# Patient Record
Sex: MALE | Race: WHITE | NOT HISPANIC OR LATINO | Employment: OTHER | URBAN - METROPOLITAN AREA
[De-identification: names, ages, dates, MRNs, and addresses within clinical notes are randomized per-mention and may not be internally consistent; named-entity substitution may affect disease eponyms.]

---

## 2022-07-18 ENCOUNTER — HOSPITAL ENCOUNTER (EMERGENCY)
Facility: MEDICAL CENTER | Age: 72
End: 2022-07-20
Attending: EMERGENCY MEDICINE
Payer: MEDICARE

## 2022-07-18 DIAGNOSIS — T14.91XA SUICIDE ATTEMPT (HCC): ICD-10-CM

## 2022-07-18 DIAGNOSIS — S61.511A LACERATION OF RIGHT WRIST, INITIAL ENCOUNTER: ICD-10-CM

## 2022-07-18 LAB
ABO + RH BLD: NORMAL
ABO GROUP BLD: NORMAL
ALBUMIN SERPL BCP-MCNC: 3.7 G/DL (ref 3.2–4.9)
ALBUMIN/GLOB SERPL: 2.1 G/DL
ALP SERPL-CCNC: 72 U/L (ref 30–99)
ALT SERPL-CCNC: 13 U/L (ref 2–50)
ANION GAP SERPL CALC-SCNC: 10 MMOL/L (ref 7–16)
APTT PPP: 31.1 SEC (ref 24.7–36)
AST SERPL-CCNC: 13 U/L (ref 12–45)
BASOPHILS # BLD AUTO: 0.2 % (ref 0–1.8)
BASOPHILS # BLD: 0.03 K/UL (ref 0–0.12)
BILIRUB SERPL-MCNC: 0.9 MG/DL (ref 0.1–1.5)
BLD GP AB SCN SERPL QL: NORMAL
BUN SERPL-MCNC: 12 MG/DL (ref 8–22)
CALCIUM SERPL-MCNC: 8.3 MG/DL (ref 8.5–10.5)
CHLORIDE SERPL-SCNC: 108 MMOL/L (ref 96–112)
CO2 SERPL-SCNC: 20 MMOL/L (ref 20–33)
CREAT SERPL-MCNC: 1.03 MG/DL (ref 0.5–1.4)
EOSINOPHIL # BLD AUTO: 0 K/UL (ref 0–0.51)
EOSINOPHIL NFR BLD: 0 % (ref 0–6.9)
ERYTHROCYTE [DISTWIDTH] IN BLOOD BY AUTOMATED COUNT: 46.9 FL (ref 35.9–50)
ETHANOL BLD-MCNC: <10.1 MG/DL
GFR SERPLBLD CREATININE-BSD FMLA CKD-EPI: 78 ML/MIN/1.73 M 2
GLOBULIN SER CALC-MCNC: 1.8 G/DL (ref 1.9–3.5)
GLUCOSE SERPL-MCNC: 119 MG/DL (ref 65–99)
HCT VFR BLD AUTO: 38.4 % (ref 42–52)
HGB BLD-MCNC: 13.3 G/DL (ref 14–18)
IMM GRANULOCYTES # BLD AUTO: 0.12 K/UL (ref 0–0.11)
IMM GRANULOCYTES NFR BLD AUTO: 0.7 % (ref 0–0.9)
INR PPP: 1.17 (ref 0.87–1.13)
LYMPHOCYTES # BLD AUTO: 0.97 K/UL (ref 1–4.8)
LYMPHOCYTES NFR BLD: 5.5 % (ref 22–41)
MCH RBC QN AUTO: 33.9 PG (ref 27–33)
MCHC RBC AUTO-ENTMCNC: 34.6 G/DL (ref 33.7–35.3)
MCV RBC AUTO: 98 FL (ref 81.4–97.8)
MONOCYTES # BLD AUTO: 0.56 K/UL (ref 0–0.85)
MONOCYTES NFR BLD AUTO: 3.2 % (ref 0–13.4)
NEUTROPHILS # BLD AUTO: 15.84 K/UL (ref 1.82–7.42)
NEUTROPHILS NFR BLD: 90.4 % (ref 44–72)
NRBC # BLD AUTO: 0 K/UL
NRBC BLD-RTO: 0 /100 WBC
PLATELET # BLD AUTO: 171 K/UL (ref 164–446)
PMV BLD AUTO: 9.8 FL (ref 9–12.9)
POC BREATHALIZER: 0 PERCENT (ref 0–0.01)
POTASSIUM SERPL-SCNC: 4.3 MMOL/L (ref 3.6–5.5)
PROT SERPL-MCNC: 5.5 G/DL (ref 6–8.2)
PROTHROMBIN TIME: 14.8 SEC (ref 12–14.6)
RBC # BLD AUTO: 3.92 M/UL (ref 4.7–6.1)
RH BLD: NORMAL
SODIUM SERPL-SCNC: 138 MMOL/L (ref 135–145)
WBC # BLD AUTO: 17.5 K/UL (ref 4.8–10.8)

## 2022-07-18 PROCEDURE — 85025 COMPLETE CBC W/AUTO DIFF WBC: CPT

## 2022-07-18 PROCEDURE — 304999 HCHG REPAIR-SIMPLE/INTERMED LEVEL 1

## 2022-07-18 PROCEDURE — 86850 RBC ANTIBODY SCREEN: CPT

## 2022-07-18 PROCEDURE — 90471 IMMUNIZATION ADMIN: CPT

## 2022-07-18 PROCEDURE — 305308 HCHG STAPLER,SKIN,DISP.

## 2022-07-18 PROCEDURE — 304217 HCHG IRRIGATION SYSTEM

## 2022-07-18 PROCEDURE — 700101 HCHG RX REV CODE 250: Performed by: EMERGENCY MEDICINE

## 2022-07-18 PROCEDURE — A9270 NON-COVERED ITEM OR SERVICE: HCPCS | Performed by: EMERGENCY MEDICINE

## 2022-07-18 PROCEDURE — 36415 COLL VENOUS BLD VENIPUNCTURE: CPT

## 2022-07-18 PROCEDURE — 700102 HCHG RX REV CODE 250 W/ 637 OVERRIDE(OP): Performed by: EMERGENCY MEDICINE

## 2022-07-18 PROCEDURE — 86900 BLOOD TYPING SEROLOGIC ABO: CPT

## 2022-07-18 PROCEDURE — 96365 THER/PROPH/DIAG IV INF INIT: CPT

## 2022-07-18 PROCEDURE — 86901 BLOOD TYPING SEROLOGIC RH(D): CPT

## 2022-07-18 PROCEDURE — 303747 HCHG EXTRA SUTURE

## 2022-07-18 PROCEDURE — 90791 PSYCH DIAGNOSTIC EVALUATION: CPT

## 2022-07-18 PROCEDURE — 302970 POC BREATHALIZER: Performed by: EMERGENCY MEDICINE

## 2022-07-18 PROCEDURE — 29125 APPL SHORT ARM SPLINT STATIC: CPT

## 2022-07-18 PROCEDURE — 302874 HCHG BANDAGE ACE 2 OR 3""

## 2022-07-18 PROCEDURE — 85730 THROMBOPLASTIN TIME PARTIAL: CPT

## 2022-07-18 PROCEDURE — 90715 TDAP VACCINE 7 YRS/> IM: CPT | Performed by: EMERGENCY MEDICINE

## 2022-07-18 PROCEDURE — 700111 HCHG RX REV CODE 636 W/ 250 OVERRIDE (IP): Performed by: EMERGENCY MEDICINE

## 2022-07-18 PROCEDURE — 85610 PROTHROMBIN TIME: CPT

## 2022-07-18 PROCEDURE — 82077 ASSAY SPEC XCP UR&BREATH IA: CPT

## 2022-07-18 PROCEDURE — 700105 HCHG RX REV CODE 258: Performed by: EMERGENCY MEDICINE

## 2022-07-18 PROCEDURE — 80053 COMPREHEN METABOLIC PANEL: CPT

## 2022-07-18 PROCEDURE — 99285 EMERGENCY DEPT VISIT HI MDM: CPT

## 2022-07-18 RX ORDER — LIDOCAINE HYDROCHLORIDE AND EPINEPHRINE 10; 10 MG/ML; UG/ML
20 INJECTION, SOLUTION INFILTRATION; PERINEURAL ONCE
Status: COMPLETED | OUTPATIENT
Start: 2022-07-18 | End: 2022-07-18

## 2022-07-18 RX ORDER — CEFAZOLIN SODIUM 2 G/100ML
2 INJECTION, SOLUTION INTRAVENOUS ONCE
Status: COMPLETED | OUTPATIENT
Start: 2022-07-18 | End: 2022-07-18

## 2022-07-18 RX ORDER — CEPHALEXIN 500 MG/1
500 CAPSULE ORAL 4 TIMES DAILY
Status: DISCONTINUED | OUTPATIENT
Start: 2022-07-18 | End: 2022-07-20

## 2022-07-18 RX ADMIN — LIDOCAINE HYDROCHLORIDE,EPINEPHRINE BITARTRATE 20 ML: 10; .01 INJECTION, SOLUTION INFILTRATION; PERINEURAL at 17:00

## 2022-07-18 RX ADMIN — WATER 2 G: 100 INJECTION, SOLUTION INTRAVENOUS at 17:40

## 2022-07-18 RX ADMIN — CLOSTRIDIUM TETANI TOXOID ANTIGEN (FORMALDEHYDE INACTIVATED), CORYNEBACTERIUM DIPHTHERIAE TOXOID ANTIGEN (FORMALDEHYDE INACTIVATED), BORDETELLA PERTUSSIS TOXOID ANTIGEN (GLUTARALDEHYDE INACTIVATED), BORDETELLA PERTUSSIS FILAMENTOUS HEMAGGLUTININ ANTIGEN (FORMALDEHYDE INACTIVATED), BORDETELLA PERTUSSIS PERTACTIN ANTIGEN, AND BORDETELLA PERTUSSIS FIMBRIAE 2/3 ANTIGEN 0.5 ML: 5; 2; 2.5; 5; 3; 5 INJECTION, SUSPENSION INTRAMUSCULAR at 22:08

## 2022-07-18 RX ADMIN — CEPHALEXIN 500 MG: 500 CAPSULE ORAL at 22:08

## 2022-07-18 NOTE — ED PROVIDER NOTES
"ED Provider  Scribed for Ifeanyi Harris D.O. by Yakov Carter. 2022  4:24 PM    Means of arrival: EMS  History obtained from: Patient   History limited by: None     CHIEF COMPLAINT  Chief Complaint   Patient presents with   • Suicide Attempt     BIB EMS for suicide attempt. Pt found by neighbors in Pt's house with laceration across Rt wrist. Pt stated to RN \"It's the end for me, or at least I tried to make it that way\". Pt expresses concerns with his and his partner's financials, housing situations, and lack of employment. Pt stated he and his partner decided upon committing suicide this morning. EMS unsure on total Pt blood loss due to blood mixing from Pt and his partner. Per EMS, partner is .     HPI  Kaiden Gonzalez is a 72 y.o. male who presents to the Emergency Department for an attempted suicide attempt. Patient reports he cut his wrists and states his partner did the same. He reports doing this with a . Patient states he does not know the whereabouts of his partner. He reports associated depression. There are no alleviating or exacerbating factors. He denies associated fever, chills, nausea, vomiting, or diarrhea.    REVIEW OF SYSTEMS  See HPI for further details. All other systems are negative.     PAST MEDICAL HISTORY       SOCIAL HISTORY  Social History     Tobacco Use   • Smoking status: None pertinent   • Smokeless tobacco: None pertinent   Substance and Sexual Activity   • Alcohol use: None pertinent   • Drug use: None pertinent   • Sexual activity: None pertinent       SURGICAL HISTORY  patient denies any surgical history    FAMILY HISTORY  No family history of suicide attempts.     CURRENT MEDICATIONS  No current outpatient medications     ALLERGIES  No Known Allergies    PHYSICAL EXAM  VITAL SIGNS: There were no vitals taken for this visit.  Constitutional: Alert in mild  distress.  HENT: No signs of trauma, mucous membranes are moist  Eyes: Conjunctiva normal, Non-icteric. "   Neck: Normal range of motion, No tenderness, Supple.  Lymphatic: No lymphadenopathy noted.   Cardiovascular: Regular rate and rhythm, no murmurs.   Thorax & Lungs: Normal breath sounds, No respiratory distress, No wheezing, No chest tenderness.   Abdomen: Bowel sounds normal, Soft, No tenderness, No masses, No pulsatile masses. No peritoneal signs.  Skin: Warm, Dry, normal color.   Back: No bony tenderness, No CVA tenderness.   Extremities: No edema, No tenderness, No cyanosis. Right forearm anteriorly has a deep laceration through the tendons and muscles.  Musculoskeletal: Good range of motion in all major joints. No tenderness to palpation or major deformities noted.   Neurologic: Alert and oriented x4, Normal motor function, Normal sensory function, No focal deficits noted.   Psychiatric: Despondent, suicidal.       DIAGNOSTIC STUDIES / PROCEDURES    LABS  Results for orders placed or performed during the hospital encounter of 07/18/22   CBC WITH DIFFERENTIAL   Result Value Ref Range    WBC 17.5 (H) 4.8 - 10.8 K/uL    RBC 3.92 (L) 4.70 - 6.10 M/uL    Hemoglobin 13.3 (L) 14.0 - 18.0 g/dL    Hematocrit 38.4 (L) 42.0 - 52.0 %    MCV 98.0 (H) 81.4 - 97.8 fL    MCH 33.9 (H) 27.0 - 33.0 pg    MCHC 34.6 33.7 - 35.3 g/dL    RDW 46.9 35.9 - 50.0 fL    Platelet Count 171 164 - 446 K/uL    MPV 9.8 9.0 - 12.9 fL    Neutrophils-Polys 90.40 (H) 44.00 - 72.00 %    Lymphocytes 5.50 (L) 22.00 - 41.00 %    Monocytes 3.20 0.00 - 13.40 %    Eosinophils 0.00 0.00 - 6.90 %    Basophils 0.20 0.00 - 1.80 %    Immature Granulocytes 0.70 0.00 - 0.90 %    Nucleated RBC 0.00 /100 WBC    Neutrophils (Absolute) 15.84 (H) 1.82 - 7.42 K/uL    Lymphs (Absolute) 0.97 (L) 1.00 - 4.80 K/uL    Monos (Absolute) 0.56 0.00 - 0.85 K/uL    Eos (Absolute) 0.00 0.00 - 0.51 K/uL    Baso (Absolute) 0.03 0.00 - 0.12 K/uL    Immature Granulocytes (abs) 0.12 (H) 0.00 - 0.11 K/uL    NRBC (Absolute) 0.00 K/uL   COMP METABOLIC PANEL   Result Value Ref Range     Sodium 138 135 - 145 mmol/L    Potassium 4.3 3.6 - 5.5 mmol/L    Chloride 108 96 - 112 mmol/L    Co2 20 20 - 33 mmol/L    Anion Gap 10.0 7.0 - 16.0    Glucose 119 (H) 65 - 99 mg/dL    Bun 12 8 - 22 mg/dL    Creatinine 1.03 0.50 - 1.40 mg/dL    Calcium 8.3 (L) 8.5 - 10.5 mg/dL    AST(SGOT) 13 12 - 45 U/L    ALT(SGPT) 13 2 - 50 U/L    Alkaline Phosphatase 72 30 - 99 U/L    Total Bilirubin 0.9 0.1 - 1.5 mg/dL    Albumin 3.7 3.2 - 4.9 g/dL    Total Protein 5.5 (L) 6.0 - 8.2 g/dL    Globulin 1.8 (L) 1.9 - 3.5 g/dL    A-G Ratio 2.1 g/dL   COD (ADULT)   Result Value Ref Range    ABO Grouping Only A     Rh Grouping Only POS     Antibody Screen-Cod NEG    PROTHROMBIN TIME (INR)   Result Value Ref Range    PT 14.8 (H) 12.0 - 14.6 sec    INR 1.17 (H) 0.87 - 1.13   APTT   Result Value Ref Range    APTT 31.1 24.7 - 36.0 sec   DIAGNOSTIC ALCOHOL   Result Value Ref Range    Diagnostic Alcohol <10.1 <10.1 mg/dL   ESTIMATED GFR   Result Value Ref Range    GFR (CKD-EPI) 78 >60 mL/min/1.73 m 2   POC BREATHALIZER   Result Value Ref Range    POC Breathalizer 0.00 0.00 - 0.01 Percent      All labs reviewed by me.    PROCEDURES:    Laceration Repair Procedure Note    Indication: Laceration    Procedure: The patient was placed in the appropriate position and anesthesia around the laceration was obtained by infiltration using 10 cc's of 1% Lidocaine with epinephrine. The area was then irrigated with normal saline. The laceration was closed in two layers. The subcutaneous layer was closed with 3-0 Chromic gut using interrupted sutures. The skin was closed with staples. A second laceration was closed with staples. The wound area was then dressed with gauze.      Total repaired wound length: 9 cm.     Other Items: Suture count: 4 and Staple count: 16    The patient tolerated the procedure well.    Complications: None    COURSE  Pertinent Labs & Imaging studies reviewed. (See chart for details)    4:24 PM - Patient seen and examined at bedside.  Discussed plan of care. The patient will be medicated with Adacel 0.5 mL, Ancef IVPB 2 g. Ordered for ABO Rh Confirm, Urine Drug Screen, POC Breathalizer, APTT, INR, COD (Adult), CMP, CBC w/diff to evaluate his symptoms.     4:50 PM - Patient medicated with lidocaine-epinephrine 1% 20 mL    5:11 PM - Paged Ortho.     5:31 PM - I discussed the patient's case and the above findings with Dr. Majano (Ortho) who recommends to have primary closure in ED and splint and follow up in the ortho outpatient clinic.     5:56 PM - Laceration repair performed as detailed above.       7/18/2022 time is 1815 admit to ED observation: Diagnosis right wrist laceration, suicidal attempt: Hold in ED for observation pending acceptance by psychiatric facility.    MEDICAL DECISION MAKING  This is a 72 y.o. male who presents with a large laceration to the right wrist.  He did this with a  with the intent to kill himself.  This is a pack with his partner, his partner did die from her wrist laceration.  This patient did become unconscious, and there was upset because he woke up.  Patient still is suicidal, he is frustrated with life, has been evicted from his home.    The evaluation of the right wrist laceration shows a going through the subcutaneous there are tendon seen in the distal part of the laceration.  He does have flexion and movement of all fingers, he has sensation and good capillary refill.  Wrist flexion is also intact.    I spoke to orthopedic surgeon on-call.  The plan will be to do primary closure placed the patient in a splint and follow-up with hand surgeon after released from psychiatric evaluation.    With amount of blood loss was concerns for anemia.  White blood cell count is elevated I suspect this to be due to stress of the injury, his hemoglobin is normal he is not tachycardic.    The patient will be held in the emergency department pending psychiatric evaluation    Critical care time 30 minutes      Admit  to ED observation    FINAL IMPRESSION  1. Suicide attempt (HCC)    2. Laceration of right wrist, initial encounter         IYakov (Scribe), am scribing for, and in the presence of, Ifeanyi Harris D.O..    Electronically signed by: Yakov Carter (Scribe), 7/18/2022    Ifeanyi AJ D.O. personally performed the services described in this documentation, as scribed by Yakov Carter in my presence, and it is both accurate and complete.    The note accurately reflects work and decisions made by me.  Ifeanyi Harris D.O.  7/18/2022  6:57 PM

## 2022-07-18 NOTE — ED TRIAGE NOTES
"Chief Complaint   Patient presents with   • Suicide Attempt     BIB EMS for suicide attempt. Pt found by neighbors in Pt's house with laceration across Rt wrist. Pt stated to RN \"It's the end for me, or at least I tried to make it that way\". Pt expresses concerns with his and his partner's financials, housing situations, and lack of employment. Pt stated he and his partner decided upon committing suicide this morning. EMS unsure on total Pt blood loss due to blood mixing from Pt and his partner. Per EMS, partner is .     Rt wrist cleaned, bleeding controlled.    Pt states to RN that he is unaware if partner is still alive or .  "

## 2022-07-19 LAB
AMPHET UR QL SCN: NEGATIVE
BARBITURATES UR QL SCN: NEGATIVE
BENZODIAZ UR QL SCN: NEGATIVE
BZE UR QL SCN: NEGATIVE
CANNABINOIDS UR QL SCN: NEGATIVE
METHADONE UR QL SCN: NEGATIVE
OPIATES UR QL SCN: NEGATIVE
OXYCODONE UR QL SCN: NEGATIVE
PCP UR QL SCN: NEGATIVE
PROPOXYPH UR QL SCN: NEGATIVE

## 2022-07-19 PROCEDURE — 303747 HCHG EXTRA SUTURE

## 2022-07-19 PROCEDURE — 305308 HCHG STAPLER,SKIN,DISP.

## 2022-07-19 PROCEDURE — 700102 HCHG RX REV CODE 250 W/ 637 OVERRIDE(OP): Performed by: EMERGENCY MEDICINE

## 2022-07-19 PROCEDURE — A9270 NON-COVERED ITEM OR SERVICE: HCPCS | Performed by: EMERGENCY MEDICINE

## 2022-07-19 PROCEDURE — 80307 DRUG TEST PRSMV CHEM ANLYZR: CPT

## 2022-07-19 PROCEDURE — 99285 EMERGENCY DEPT VISIT HI MDM: CPT

## 2022-07-19 PROCEDURE — 36415 COLL VENOUS BLD VENIPUNCTURE: CPT

## 2022-07-19 PROCEDURE — 96365 THER/PROPH/DIAG IV INF INIT: CPT

## 2022-07-19 PROCEDURE — 304999 HCHG REPAIR-SIMPLE/INTERMED LEVEL 1

## 2022-07-19 RX ORDER — SERTRALINE HYDROCHLORIDE 100 MG/1
50 TABLET, FILM COATED ORAL DAILY
Status: DISCONTINUED | OUTPATIENT
Start: 2022-07-19 | End: 2022-07-20 | Stop reason: HOSPADM

## 2022-07-19 RX ADMIN — SERTRALINE 50 MG: 100 TABLET, FILM COATED ORAL at 15:00

## 2022-07-19 RX ADMIN — CEPHALEXIN 500 MG: 500 CAPSULE ORAL at 15:00

## 2022-07-19 RX ADMIN — CEPHALEXIN 500 MG: 500 CAPSULE ORAL at 08:21

## 2022-07-19 RX ADMIN — CEPHALEXIN 500 MG: 500 CAPSULE ORAL at 19:31

## 2022-07-19 RX ADMIN — CEPHALEXIN 500 MG: 500 CAPSULE ORAL at 23:51

## 2022-07-19 ASSESSMENT — FIBROSIS 4 INDEX: FIB4 SCORE: 1.48

## 2022-07-19 NOTE — ED NOTES
Pt sitting up in bed eating lunch tray provided with even and unlabored breaths. Pt is still in view of sitter and no acute distress is noted at this time. Will continue to monitor.

## 2022-07-19 NOTE — ED NOTES
Patient resting on stretcher in no acute distress. Equal chest rise and fall noted. Sitter in doorway for direct 1:1 observation. Room safety checklist in use. No needs at this time, will continue to monitor

## 2022-07-19 NOTE — ED NOTES
Pt placed on L2k by Alert Team RN. Pt belongings removed from room. Security called for belongings search. Non-medically necessary equipment removed from room.

## 2022-07-19 NOTE — ED NOTES
Received report from Anisha HUMPHRIES RN. At this time pt is resting in bed in view of sitter and RN station. No distress is noted at this time and pt has even and unlabored breaths.

## 2022-07-19 NOTE — ED NOTES
Patient sitting up on stretcher relaxing, NAD noted. Sitter remains in doorway for 1:1 observation. Room safety checklist in use.

## 2022-07-19 NOTE — CONSULTS
"[facility]   TRIAGE ASSESSMENT    Name: Kaiden Gonzalez  MRN: [id number]  : 1950  Age: 72 y.o.  Date of assessment: 2022  PCP: Pcp Pt States None  Persons in attendance: Patient  Patient Location: [facility]    CHIEF COMPLAINT/PRESENTING ISSUE (as stated by patient): Pt attempted suicide by cutting his right wrist with a . Wound required suturing and will need follow up with orthopedic surgeons once psychiatric symptoms are stable. Hesitation marks present. Pt states that he planned to kill himself in a suicide pact with his significant other. She cut herself first and handed him the blade. He states he is not sure if she was successful in ending her life (per EMS, she is ), but he states he assumes so because she hit an artery and he could see blood squirting up to the ceiling. He does not express sadness or grief when discussing this. He states that he became lightheaded and \"faded in and out\" but came to and called 911 for assistance. Pt sites reasons for the attempt is financial. He has no source of income. His partner is able to work but she has difficulty applying for jobs online. He and his partner are about to be evicted from the trailer they are living in in [place]. He states they attempted to access various resources through [facility] but states there are wait lists and things are backlogged. Pt states \"I'm a dinosaur. This technology stuff left me in the dust.\" This has caused difficulty for him in accessing resources. Pt has no history of mental health treatment and has never taken medication. He endorses depression, and states he has had it most of his life. He repeatedly states that he is amazed that it didn't work and he would do it again if he had a chance.   Chief Complaint   Patient presents with   • Suicide Attempt     BIB EMS for suicide attempt. Pt found by neighbors in Pt's house with laceration across Rt wrist. Pt " "stated to RN \"It's the end for me, or at least I tried to make it that way\". Pt expresses concerns with his and his partner's financials, housing situations, and lack of employment. Pt stated he and his partner decided upon committing suicide this morning. EMS unsure on total Pt blood loss due to blood mixing from Pt and his partner. Per EMS, partner is .        CURRENT LIVING SITUATION/SOCIAL SUPPORT/FINANCIAL RESOURCES: About to be evicted from his trailer in [place].    BEHAVIORAL HEALTH/SUBSTANCE USE TREATMENT HISTORY  Does patient/parent report a history of prior behavioral health/substance use treatment for patient?   No:    SAFETY ASSESSMENT - SELF  Does patient acknowledge current or past symptoms of dangerousness to self or is previous history noted? yes  Does parent/significant other report patient has current or past symptoms of dangerousness to self? N\A  Does presenting problem suggest symptoms of dangerousness to self? Yes:     Past Current    Suicidal Thoughts: []  [x]    Suicidal Plans: []  [x]    Suicidal Intent: []  [x]    Suicide Attempts: []  [x]    Self-Injury []  []      For any boxes checked above, provide detail: attempted via cutting his wrist.    History of suicide by family member: yes - father  via self-inflicted gunshot  History of suicide by friend/significant other: no  Recent change in frequency/specificity/intensity of suicidal thoughts or self-harm behavior? yes -    Current access to firearms, medications, or other identified means of suicide/self-harm? yes - pt has a   If yes, willing to restrict access to means of suicide/self-harm? no  Protective factors present:  unable to identify protective factors    SAFETY ASSESSMENT - OTHERS  Does patient acknowledge current or past symptoms of aggressive behavior or risk to others or is previous history noted? no  Does parent/significant other report patient has current or past symptoms of aggressive behavior or risk " "to others?  N\A  Does presenting problem suggest symptoms of dangerousness to others? No    LEGAL HISTORY  Does patient acknowledge history of arrest/USP/prison or is previous history noted? no    Crisis Safety Plan completed and copy given to patient? N\A    ABUSE/NEGLECT SCREENING  Does patient report feeling “unsafe” in his/her home, or afraid of anyone?  no  Does patient report any history of physical, sexual, or emotional abuse?  no  Does parent or significant other report any of the above? N\A  Is there evidence of neglect by self?  no  Is there evidence of neglect by a caregiver? no  Does the patient/parent report any history of CPS/APS/police involvement related to suspected abuse/neglect or domestic violence? no  Based on the information provided during the current assessment, is a mandated report of suspected abuse/neglect being made?  No    SUBSTANCE USE SCREENING  Yes:  Ramon all substances used in the past 30 days:      Last Use Amount   []   Alcohol     []   Marijuana     []   Heroin     []   Prescription Opioids  (used without prescription, for    recreation, or in excess of prescribed amount)     []   Other Prescription  (used without prescription, for    recreation, or in excess of prescribed amount)     []   Cocaine      []   Methamphetamine     []   \"\" drugs (ectasy, MDMA)     []   Other substances        UDS results: pending  Breathalyzer results: 0.0    What consequences does the patient associate with any of the above substance use and or addictive behaviors? None    Risk factors for detox (check all that apply):  []  Seizures   []  Diaphoretic (sweating)   []  Tremors   []  Hallucinations   []  Increased blood pressure   []  Decreased blood pressure   []  Other   []  None      [] Patient education on risk factors for detoxification and instructed to return to ER as needed.      MENTAL STATUS   Participation: Active verbal participation  Grooming: Good  Orientation: Alert and Fully " Oriented  Behavior: Calm  Eye contact: Good  Mood: Depressed  Affect: Congruent with content  Thought process: Logical and Goal-directed  Thought content: Within normal limits  Speech: Rate within normal limits and Volume within normal limits  Perception: Within normal limits  Memory:  No gross evidence of memory deficits  Insight: Limited  Judgment:  Poor  Other:    Collateral information:    Source:  [] Significant other present in person:   [] Significant other by telephone  [] [facility]   [] [facility] Nursing Staff  [] [facility]  [] Other:     [] Unable to complete full assessment due to:  [] Acute intoxication  [] Patient declined to participate/engage  [] Patient verbally unresponsive  [] Significant cognitive deficits  [] Significant perceptual distortions or behavioral disorganization  [] Other:      CLINICAL IMPRESSIONS:  Primary:  Suicide attempt  Secondary:  Housing, financial        IDENTIFIED NEEDS/PLAN:  [Trigger DISPOSITION list for any items marked]    [x]  Imminent safety risk - self [] Imminent safety risk - others   []  Acute substance withdrawal []  Psychosis/Impaired reality testing   []  Mood/anxiety []  Substance use/Addictive behavior   []  Maladaptive behaviro []  Parent/child conflict   []  Family/Couples conflict []  Biomedical   [x]  Housing [x]  Financial   []   Legal  Occupational/Educational   []  Domestic violence []  Other:     Recommended Plan of Care:  1:1 [facility] ED, Legal hold, refer to inpatient psychiatric services.  *Telesitter may not be utilized for moderate or high risk patients    Has the Recommended Plan of Care/Level of Observation been reviewed with the patient's assigned nurse? yes    Does patient/parent or guardian express agreement with the above plan? yes    Referral appointment(s) scheduled? N\A    Alert team only:   I have discussed findings and recommendations with Dr. Harris who is in agreement with these recommendations.      Referral information sent to the following outpatient community providers :    Referral information sent to the following inpatient community providers : [facility]    If applicable : Referred  to  Alert Team for legal hold follow up at (time): 7/21/22      Maame Vegas R.N.  7/18/2022
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
## 2022-07-19 NOTE — ED NOTES
Medicated Pt according to MAR.    Sitter at bedside for 1:1 observation.    Rounded on Pt.    Updated Pt on plan of care. Pt verbalized understanding.  No acute distress at this time.  Will continue to monitor.   30 45

## 2022-07-19 NOTE — ED NOTES
Patient resting on stretcher in no acute distress. Equal chest rise and fall noted. Sitter in doorway for direct 1:1 observation. Room safety checklist in use. No needs at this time, will continue to monitor. Breakfast tray delivered

## 2022-07-19 NOTE — ED NOTES
Report from CRISTY Ramirez. Patient resting on stretcher in no acute distress. Equal chest rise and fall noted. Sitter in doorway for direct 1:1 observation. Room safety checklist in use. No needs at this time, will continue to monitor
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
## 2022-07-19 NOTE — DISCHARGE PLANNING
ADDENDUM  1650  PRINCE had contacted [facility] to see if they would help the patient apply for Medicaid. PRINCE spoke with [facility] who reported that he did help the patient compelete the Medicaid application. It will take up to 45 days before a decision is made.    PRINCE met with patient about his community resource options. He reported that he does not have a DL, clothing, income, or medical coverage. Pt is homeless due to a recent eviction. PRINCE provided the patient with a resource list and explained the process to him. PRINCE notified Maame with the Alert team..

## 2022-07-19 NOTE — DISCHARGE PLANNING
[facility] ALERT TEAM DISCHARGE PLANNING NOTE    Date:  7/19/22  Patient Name:  Kaiden Pollard 72 y.o. - Discharge Planning  MRN:  [id number]   YOB: 1950  ADMISSION DATE:  7/18/2022      Writer forwarded transfer packet for inpatient psychiatric care to the following community providers:  [facility] via [employer]   Items  included in the transfer packet:   __x___Face Sheet   __x___Pages 1 and 2 of completed legal hold   __x___Alert Team/Psych Assessment   __x___H&P   _____UDS   __x___Blood Alcohol   __x___Vital signs   __n/a___Pregnancy Test (if applicable)   _____Medications List   _____Covid Screen

## 2022-07-19 NOTE — ED NOTES
Belongings tendered to PD per search warrant. Pt does not have street clothes for discharge.    Paper work provided to Pt by PD placed in belongings bag and secured in [address].

## 2022-07-19 NOTE — ED NOTES
Patient's home medications have been reviewed by the pharmacy team.     No past medical history on file.    Patient's Medications    No medications on file          A:  Medications do not appear to be contributing to current complaints.       P:    No recommendations at this time. Home medications have been reordered as appropriate.      Sheila Chen, CassieD

## 2022-07-20 VITALS
WEIGHT: 200 LBS | TEMPERATURE: 97.1 F | DIASTOLIC BLOOD PRESSURE: 68 MMHG | HEIGHT: 70 IN | HEART RATE: 53 BPM | RESPIRATION RATE: 16 BRPM | BODY MASS INDEX: 28.63 KG/M2 | OXYGEN SATURATION: 95 % | SYSTOLIC BLOOD PRESSURE: 142 MMHG

## 2022-07-20 PROCEDURE — 700102 HCHG RX REV CODE 250 W/ 637 OVERRIDE(OP): Performed by: EMERGENCY MEDICINE

## 2022-07-20 PROCEDURE — A9270 NON-COVERED ITEM OR SERVICE: HCPCS | Performed by: EMERGENCY MEDICINE

## 2022-07-20 RX ORDER — ACETAMINOPHEN 325 MG/1
650 TABLET ORAL ONCE
Status: DISCONTINUED | OUTPATIENT
Start: 2022-07-20 | End: 2022-07-20 | Stop reason: HOSPADM

## 2022-07-20 RX ORDER — IBUPROFEN 600 MG/1
600 TABLET ORAL ONCE
Status: COMPLETED | OUTPATIENT
Start: 2022-07-20 | End: 2022-07-20

## 2022-07-20 RX ORDER — CEPHALEXIN 500 MG/1
500 CAPSULE ORAL 2 TIMES DAILY
Status: DISCONTINUED | OUTPATIENT
Start: 2022-07-20 | End: 2022-07-20 | Stop reason: HOSPADM

## 2022-07-20 RX ADMIN — IBUPROFEN 600 MG: 600 TABLET ORAL at 15:00

## 2022-07-20 RX ADMIN — SERTRALINE 50 MG: 100 TABLET, FILM COATED ORAL at 06:32

## 2022-07-20 RX ADMIN — CEPHALEXIN 500 MG: 500 CAPSULE ORAL at 17:48

## 2022-07-20 NOTE — ED NOTES
Pt resting in [facility].  No needs or complaints voiced at this time.   Sitter at bedside.

## 2022-07-20 NOTE — ED NOTES
Pt resting in [facility].  No needs or complaints voiced at this time.   Sitter at bedside.

## 2022-07-20 NOTE — ED NOTES
0736  Pt care assumed.  1:1 sitter in place.      0800  Pt up independently, provided new treaded slipper socks.  Pt onto hospital bed.  Provided update on POC.      0916  Pt resting comfortably.  Sitter remains in place for 1:1 observation.

## 2022-07-20 NOTE — ED NOTES
Pt medicated with morning meds. Socks and underwear given.   Pt states that his back is hurting but he has not had a BM since he's been here. Pt states he didn't know he was allowed to get out of bed. Pt educated on bathroom use. Pt happy to get out of bed and use bathroom. Bed sheets straightened up. Hospital bed ordered.  Day sitter now outside of room.

## 2022-07-20 NOTE — DISCHARGE PLANNING
Alert Team Note:    Received signed KRISTEN from Stefanie Hudson. Writer sent signed KRISTEN to [facility].

## 2022-07-20 NOTE — ED NOTES
Report received from CRISTY Mercer. Room safety checklist in place, pt in direct view of 1:1 sitter.
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
## 2022-07-20 NOTE — ED NOTES
Report from Yee MUNIZ.  Pt appears to be asleep in bed, NAD.   1:1 sitter outside of room in direct line of sight.

## 2022-07-20 NOTE — ED NOTES
Report given to Yee ROYAL RN. Upon shift change pt is resting in bed in view of sitter with even and unlabored breaths, in no acute distress.

## 2022-07-20 NOTE — ED NOTES
Pt given apple juice. No other voiced needs at this time. States he is comfortable, watching TV & dozing on & off. In direct view of 1:1 sitter.

## 2022-07-20 NOTE — ED NOTES
Pt requesting channel be changed on tv. Sitter assisted with this task.   1:1 sitter in direct line of sight.

## 2022-07-20 NOTE — CONSULTS
"  [facility] PSYCHIATRIC FOLLOW-UP:(established)    DOS: 22     Reason for admission:  Suicide attempt  Legal Hold Status: on legal hold      ID/Chief Complaint: Patient is a 72 y.o.  male, without formal psych hx, and medical hx s/f self reported TBI in childhood, also s/p suicide attempt with lacerations to R wrist.   Chief Complaint   Patient presents with   • Suicide Attempt     BIB EMS for suicide attempt. Pt found by neighbors in Pt's house with laceration across Rt wrist. Pt stated to RN \"It's the end for me, or at least I tried to make it that way\". Pt expresses concerns with his and his partner's financials, housing situations, and lack of employment. Pt stated he and his partner decided upon committing suicide this morning. EMS unsure on total Pt blood loss due to blood mixing from Pt and his partner. Per EMS, partner is .                 S:  Patient reporting mood to be \"pretty good.\" Distracts himself from thoughts of suicide with the TV. Denies HI, A/VH. Sleep is \"broken up\" but actually improving compared to when he is at home - says the bed here is much better than what he had at home. Appetite intact.         O: Medical ROS (as pertinent): Throbbing pain in his R forearm (where laceration is) that is worse at night. Requesting ibuprofen or other pain medication,  And RN aware.  There is no problem list on file for this patient.        PSYCHIATRIC EXAM (MSE):   Vitals:    22 2301   BP: 123/66   Pulse: (!) 53   Resp:    Temp:    SpO2: 93%      Weight: 90.7kg 22    Constitutional: as noted above  General Appearance/Behavior: 72 y.o. appears stated age, improved EC today, sitting up in bed, No behavioral disturbances  Abnormal Movements: none, no PMA/PMR or tremor observed.  Gait and Posture: not observed  Musculoskeletal: as noted above  Mood: \"pretty good\"  Affect: appears to have mild improvement - more content today.   Speech: normal rate, normal " rhythm, normal tone, normal volume and normal fluency  Language:  spontaneous, comprehends spoken commands and fluent   Thought Process: Linear Logical  Thought Content: Denies SI/HI. Denies A/VH, no e/o delusions, PI, or internal preoccupation.   Insight/Judgement:  intact  Alert/Orientation: alert, oriented to person, place and time  Attn/Concentration: normal  Fund of Knowledge: Average  Memory recent/remote: No gross evidence of memory deficits  MMSE: deferred this visit          Meds Current:  Scheduled Medications   Medication Dose Frequency   • cephALEXin  500 mg BID   • sertraline  50 mg DAILY     Allergies: No Known Allergies        *ASSESSMENT:   1. Suicide attempt (HCC)    2. Laceration of right wrist, initial encounter       Psychiatric:   Suicide attempt  Adjustment disorder with depressed mood  R/O MDD  Pt reports hx of TBI (see medical ROS)     Medical: as noted by the medical treatment team.   Laceration to R wrist     Patient with some improvement in mood today, however still presenting with elevated risk of harm to self and would benefit from inpatient psych hospital for stabilization/treatment.     Recommendations:  Legal Status: on legal hold  Pt appears to be at acute risk of harming self. Disposition per primary medical team     Please transfer patient to inpatient psychiatric hospital when medically cleared and bed is available.     Observation status:   -line of site with sitter - ok for patient to walk around the unit with a sitter.      Visitors: No   Personal belongings: Ok to allow phone (with close obs if there is a cord)    Discussed/voalted: Dr. Russo, RN, CNA    Medication Recommendations: Final orders as per Treatment Team  1. Started sertraline 7/19/22.  a. Could consider lithium as it is the gold standard for SI, however without close follow up this can be a very risky medication  2. Requesting pain medication (Dr. ruth)  3. Risks/benefits/side effects discussed, patient  verbalized understanding  4. Medication reconciliation was completed.  5. Reviewed safety plan: 911, ER, PCM, MHC, suicide crisis line, nursing staff while inpatient.    Will Continue to Follow. Thank you for the consult.

## 2022-07-20 NOTE — DISCHARGE PLANNING
Alert Team Note:    Sent updated packet to [facility] for consideration under an KRISTEN.

## 2022-07-20 NOTE — DISCHARGE PLANNING
Alert Team Note:    Contacted by [facility], spoke to Marcia. Pt has been accepted PENDING an KRISTEN. KRISTEN process initiated.     Writer has sent the KRISTEN to Suma Nunez, Erna Martin, Stefanie Hudson, and Tab Lorenzana to be signed. Writer also sent them a voalte message explaining that the pt has a bed waiting at [facility].

## 2022-07-21 NOTE — DISCHARGE PLANNING
Legal Hold Transfer     Referral: Legal hold transfer to Mental Health Facility     Intervention: Informed by Mariano at [facility] that the pt has been accepted     Pt's accepting physcian is Dr. Guzman     Transport arranged through [facility]     The pt will be picked up at 2100      Notified Bedside CRISTY Cary and Alert Team CRISTY Isabel of the departure time as well as accepting facility.      Transfer packet created with original legal hold and placed on chart.      Plan: Pt will be transferring to [facility] via [facility] at 2100.

## 2022-07-21 NOTE — ED NOTES
Report from CRISTY Cary. Pt leaving to [facility] at 2100. 1:1 sitter in place.

## 2022-07-21 NOTE — ED NOTES
Report given to CRISTY Haider. Receiving RN at [facility]. Pt updated on POC. Pt verbalized understanding.

## 2022-07-21 NOTE — ED NOTES
[facility] at bedside to transport pt to [facility].  Report given and legal hold packet given to [facility]. Pt ambulatory out to ambulance with [facility].

## 2023-04-03 ENCOUNTER — OFFICE VISIT (OUTPATIENT)
Dept: MEDICAL GROUP | Facility: MEDICAL CENTER | Age: 73
End: 2023-04-03
Payer: MEDICARE

## 2023-04-03 VITALS
HEIGHT: 70 IN | HEART RATE: 50 BPM | BODY MASS INDEX: 30.38 KG/M2 | DIASTOLIC BLOOD PRESSURE: 68 MMHG | SYSTOLIC BLOOD PRESSURE: 132 MMHG | OXYGEN SATURATION: 94 % | TEMPERATURE: 97.7 F | WEIGHT: 212.2 LBS

## 2023-04-03 DIAGNOSIS — F32.5 MAJOR DEPRESSIVE DISORDER WITH SINGLE EPISODE, IN FULL REMISSION (HCC): ICD-10-CM

## 2023-04-03 DIAGNOSIS — E66.9 OBESITY (BMI 30-39.9): ICD-10-CM

## 2023-04-03 DIAGNOSIS — Z12.11 SCREENING FOR COLORECTAL CANCER: ICD-10-CM

## 2023-04-03 DIAGNOSIS — E05.00 GRAVES DISEASE: ICD-10-CM

## 2023-04-03 DIAGNOSIS — Z13.6 ENCOUNTER FOR ABDOMINAL AORTIC ANEURYSM (AAA) SCREENING: ICD-10-CM

## 2023-04-03 DIAGNOSIS — Z11.59 NEED FOR HEPATITIS C SCREENING TEST: ICD-10-CM

## 2023-04-03 DIAGNOSIS — Z12.12 SCREENING FOR COLORECTAL CANCER: ICD-10-CM

## 2023-04-03 PROBLEM — T14.91XA SUICIDAL BEHAVIOR WITH ATTEMPTED SELF-INJURY (HCC): Status: ACTIVE | Noted: 2023-04-03

## 2023-04-03 PROCEDURE — 99204 OFFICE O/P NEW MOD 45 MIN: CPT

## 2023-04-03 ASSESSMENT — ENCOUNTER SYMPTOMS
SHORTNESS OF BREATH: 0
CHILLS: 0
COUGH: 0
FEVER: 0
PALPITATIONS: 0
ORTHOPNEA: 0

## 2023-04-03 ASSESSMENT — PATIENT HEALTH QUESTIONNAIRE - PHQ9
SUM OF ALL RESPONSES TO PHQ QUESTIONS 1-9: 4
SUM OF ALL RESPONSES TO PHQ QUESTIONS 1-9: 4
5. POOR APPETITE OR OVEREATING: 0 - NOT AT ALL
4. FEELING TIRED OR HAVING LITTLE ENERGY: SEVERAL DAYS
1. LITTLE INTEREST OR PLEASURE IN DOING THINGS: NOT AT ALL
7. TROUBLE CONCENTRATING ON THINGS, SUCH AS READING THE NEWSPAPER OR WATCHING TELEVISION: NOT AT ALL
CLINICAL INTERPRETATION OF PHQ2 SCORE: 1
3. TROUBLE FALLING OR STAYING ASLEEP OR SLEEPING TOO MUCH: SEVERAL DAYS
8. MOVING OR SPEAKING SO SLOWLY THAT OTHER PEOPLE COULD HAVE NOTICED. OR THE OPPOSITE, BEING SO FIGETY OR RESTLESS THAT YOU HAVE BEEN MOVING AROUND A LOT MORE THAN USUAL: NOT AT ALL
SUM OF ALL RESPONSES TO PHQ9 QUESTIONS 1 AND 2: 1
6. FEELING BAD ABOUT YOURSELF - OR THAT YOU ARE A FAILURE OR HAVE LET YOURSELF OR YOUR FAMILY DOWN: SEVERAL DAYS
2. FEELING DOWN, DEPRESSED, IRRITABLE, OR HOPELESS: SEVERAL DAYS
5. POOR APPETITE OR OVEREATING: NOT AT ALL
9. THOUGHTS THAT YOU WOULD BE BETTER OFF DEAD, OR OF HURTING YOURSELF: NOT AT ALL

## 2023-04-03 ASSESSMENT — FIBROSIS 4 INDEX: FIB4 SCORE: 1.5

## 2023-04-03 NOTE — PROGRESS NOTES
"Subjective:     CC: Establish Care (Prior pop: unknown )      HISTORY OF THE PRESENT ILLNESS: Kaiden is a 73 y.o. male here to establish care and discuss his history of depression. He states that he was severely depressed after he lost his sister and his mother very close together. He was hospitalized because of it. He was taking medication, but is no longer taking it. He feels like he is managing this well, and that it is in remission. He was managing his depression with Lexapro. His PHQ-9 score was 4. He does have a history of SI when he tried to cut his own wrist. He was unsuccessful in his attempt; however, his partner passed away at that time (7/18/22) as they had a \"suicide pack\" due to stressful financial times.        He was on meds for a few months for graves disease, back in 2002. He was seeing a endocrinologist in the past for this. He states he did not have a thyroidectomy, but his symptoms have been in remission since taking the medications prescribed by them. He went in originally for weight loss. He states he has not been to the doctor in years.     Previous PCP: None  Allergies: No Known Allergies  Medications:   Current Outpatient Medications:     IBUPROFEN PO, Take  by mouth., Disp: , Rfl:     ROS:   Review of Systems   Constitutional:  Negative for chills and fever.   Respiratory:  Negative for cough and shortness of breath.    Cardiovascular:  Negative for chest pain, palpitations, orthopnea and leg swelling.        Objective:     Exam:   /68 (BP Location: Left arm, Patient Position: Sitting, BP Cuff Size: Adult)   Pulse (!) 50   Temp 36.5 °C (97.7 °F) (Temporal)   Ht 1.778 m (5' 10\")   Wt 96.3 kg (212 lb 3.2 oz)   SpO2 94%  Body mass index is 30.45 kg/m².    Physical Exam  Constitutional:       Appearance: He is normal weight.   Eyes:      Pupils: Pupils are equal, round, and reactive to light.   Cardiovascular:      Rate and Rhythm: Normal rate and regular rhythm.      Pulses: " Normal pulses.      Heart sounds: Normal heart sounds.   Pulmonary:      Effort: Pulmonary effort is normal.      Breath sounds: Normal breath sounds.   Neurological:      Mental Status: He is alert and oriented to person, place, and time.   Psychiatric:         Mood and Affect: Mood normal.         Behavior: Behavior normal.         Assessment & Plan:   73 y.o. male with the following -    1. Graves disease  Chronic, stable  - TSH WITH REFLEX TO FT4; Future  - TRIIDOTHYRONINE; Future    2. Major depressive disorder with single episode, in full remission (HCC)  Chronic, stable  - continue to monitor for signs of depression.     3. Obesity (BMI 30-39.9)  Chronic, stable  - TSH WITH REFLEX TO FT4; Future  - Comp Metabolic Panel; Future  - Lipid Profile; Future  - CBC WITHOUT DIFFERENTIAL; Future  - TRIIDOTHYRONINE; Future  - Patient identified as having weight management issue.  Appropriate orders and counseling given.    4. Encounter for abdominal aortic aneurysm (AAA) screening  - US-ABDOMINAL AORTA SCREEN (AAA); Future    5. Need for hepatitis C screening test  - HEP C VIRUS ANTIBODY; Future    6. Screening for colorectal cancer  - COLOGUARD (FIT DNA)    Other orders  - IBUPROFEN PO; Take  by mouth.      Anticipatory guidance  Patient counseled about skin care, diet, supplements, prenatal vitamins, safe sex and exercise, smoking, drugs/alcohol use, and wearing a seat belt.       Return in about 6 months (around 10/3/2023).    Please note that this dictation was created using voice recognition software. I have made every reasonable attempt to correct obvious errors, but I expect that there are errors of grammar and possibly content that I did not discover before finalizing the note.

## 2023-04-07 ENCOUNTER — SUPERVISING PHYSICIAN REVIEW (OUTPATIENT)
Dept: MEDICAL GROUP | Facility: MEDICAL CENTER | Age: 73
End: 2023-04-07
Payer: MEDICARE

## 2023-04-07 NOTE — PROGRESS NOTES
I have reviewed and agree with history, assessment, and plan for office encounter on 4/3/2023 with DES [place].    Face to face encounter/direct observation: no    Suggested changes to plan or follow-up: none

## 2023-05-12 ENCOUNTER — HOSPITAL ENCOUNTER (OUTPATIENT)
Dept: LAB | Facility: MEDICAL CENTER | Age: 73
End: 2023-05-12
Payer: MEDICARE

## 2023-05-12 DIAGNOSIS — E66.9 OBESITY (BMI 30-39.9): ICD-10-CM

## 2023-05-12 DIAGNOSIS — E05.00 GRAVES DISEASE: ICD-10-CM

## 2023-05-12 DIAGNOSIS — Z11.59 NEED FOR HEPATITIS C SCREENING TEST: ICD-10-CM

## 2023-05-12 LAB
ALBUMIN SERPL BCP-MCNC: 4.1 G/DL (ref 3.2–4.9)
ALBUMIN/GLOB SERPL: 1.5 G/DL
ALP SERPL-CCNC: 94 U/L (ref 30–99)
ALT SERPL-CCNC: 12 U/L (ref 2–50)
ANION GAP SERPL CALC-SCNC: 12 MMOL/L (ref 7–16)
AST SERPL-CCNC: 15 U/L (ref 12–45)
BILIRUB SERPL-MCNC: 0.8 MG/DL (ref 0.1–1.5)
BUN SERPL-MCNC: 12 MG/DL (ref 8–22)
CALCIUM ALBUM COR SERPL-MCNC: 9 MG/DL (ref 8.5–10.5)
CALCIUM SERPL-MCNC: 9.1 MG/DL (ref 8.5–10.5)
CHLORIDE SERPL-SCNC: 107 MMOL/L (ref 96–112)
CHOLEST SERPL-MCNC: 202 MG/DL (ref 100–199)
CO2 SERPL-SCNC: 22 MMOL/L (ref 20–33)
CREAT SERPL-MCNC: 0.96 MG/DL (ref 0.5–1.4)
ERYTHROCYTE [DISTWIDTH] IN BLOOD BY AUTOMATED COUNT: 47.5 FL (ref 35.9–50)
GFR SERPLBLD CREATININE-BSD FMLA CKD-EPI: 84 ML/MIN/1.73 M 2
GLOBULIN SER CALC-MCNC: 2.8 G/DL (ref 1.9–3.5)
GLUCOSE SERPL-MCNC: 105 MG/DL (ref 65–99)
HCT VFR BLD AUTO: 51 % (ref 42–52)
HCV AB SER QL: NORMAL
HDLC SERPL-MCNC: 51 MG/DL
HGB BLD-MCNC: 16.7 G/DL (ref 14–18)
LDLC SERPL CALC-MCNC: 138 MG/DL
MCH RBC QN AUTO: 32 PG (ref 27–33)
MCHC RBC AUTO-ENTMCNC: 32.7 G/DL (ref 33.7–35.3)
MCV RBC AUTO: 97.7 FL (ref 81.4–97.8)
PLATELET # BLD AUTO: 189 K/UL (ref 164–446)
PMV BLD AUTO: 9.8 FL (ref 9–12.9)
POTASSIUM SERPL-SCNC: 4.1 MMOL/L (ref 3.6–5.5)
PROT SERPL-MCNC: 6.9 G/DL (ref 6–8.2)
RBC # BLD AUTO: 5.22 M/UL (ref 4.7–6.1)
SODIUM SERPL-SCNC: 141 MMOL/L (ref 135–145)
T3 SERPL-MCNC: 119 NG/DL (ref 60–181)
TRIGL SERPL-MCNC: 64 MG/DL (ref 0–149)
TSH SERPL DL<=0.005 MIU/L-ACNC: 4.1 UIU/ML (ref 0.38–5.33)
WBC # BLD AUTO: 6.2 K/UL (ref 4.8–10.8)

## 2023-05-12 PROCEDURE — 80053 COMPREHEN METABOLIC PANEL: CPT

## 2023-05-12 PROCEDURE — 84480 ASSAY TRIIODOTHYRONINE (T3): CPT

## 2023-05-12 PROCEDURE — 80061 LIPID PANEL: CPT

## 2023-05-12 PROCEDURE — 36415 COLL VENOUS BLD VENIPUNCTURE: CPT

## 2023-05-12 PROCEDURE — 85027 COMPLETE CBC AUTOMATED: CPT

## 2023-05-12 PROCEDURE — 86803 HEPATITIS C AB TEST: CPT

## 2023-05-12 PROCEDURE — 84443 ASSAY THYROID STIM HORMONE: CPT

## 2023-10-03 NOTE — ED NOTES
Pt medicated per MAR. Pt currently eating dinner w/ no needs at this time.   Alert-The patient is alert, awake and responds to voice. The patient is oriented to time, place, and person. The triage nurse is able to obtain subjective information.
